# Patient Record
Sex: FEMALE | Race: WHITE | NOT HISPANIC OR LATINO
[De-identification: names, ages, dates, MRNs, and addresses within clinical notes are randomized per-mention and may not be internally consistent; named-entity substitution may affect disease eponyms.]

---

## 2017-04-26 ENCOUNTER — APPOINTMENT (OUTPATIENT)
Dept: OBGYN | Facility: CLINIC | Age: 47
End: 2017-04-26

## 2017-05-03 ENCOUNTER — OUTPATIENT (OUTPATIENT)
Dept: OUTPATIENT SERVICES | Facility: HOSPITAL | Age: 47
LOS: 1 days | End: 2017-05-03
Payer: COMMERCIAL

## 2017-05-03 ENCOUNTER — APPOINTMENT (OUTPATIENT)
Dept: ULTRASOUND IMAGING | Facility: CLINIC | Age: 47
End: 2017-05-03
Payer: COMMERCIAL

## 2017-05-03 DIAGNOSIS — Z00.8 ENCOUNTER FOR OTHER GENERAL EXAMINATION: ICD-10-CM

## 2017-05-03 PROCEDURE — 76856 US EXAM PELVIC COMPLETE: CPT | Mod: 26,59

## 2017-05-03 PROCEDURE — 76830 TRANSVAGINAL US NON-OB: CPT

## 2017-05-03 PROCEDURE — 76856 US EXAM PELVIC COMPLETE: CPT

## 2017-05-03 PROCEDURE — 76830 TRANSVAGINAL US NON-OB: CPT | Mod: 26

## 2017-06-01 ENCOUNTER — OUTPATIENT (OUTPATIENT)
Dept: OUTPATIENT SERVICES | Facility: HOSPITAL | Age: 47
LOS: 1 days | End: 2017-06-01
Payer: COMMERCIAL

## 2017-06-01 VITALS
TEMPERATURE: 99 F | SYSTOLIC BLOOD PRESSURE: 132 MMHG | WEIGHT: 169.76 LBS | OXYGEN SATURATION: 98 % | RESPIRATION RATE: 18 BRPM | DIASTOLIC BLOOD PRESSURE: 88 MMHG | HEART RATE: 75 BPM | HEIGHT: 60 IN

## 2017-06-01 DIAGNOSIS — Z01.818 ENCOUNTER FOR OTHER PREPROCEDURAL EXAMINATION: ICD-10-CM

## 2017-06-01 DIAGNOSIS — D25.9 LEIOMYOMA OF UTERUS, UNSPECIFIED: ICD-10-CM

## 2017-06-01 DIAGNOSIS — Z98.890 OTHER SPECIFIED POSTPROCEDURAL STATES: Chronic | ICD-10-CM

## 2017-06-01 DIAGNOSIS — I10 ESSENTIAL (PRIMARY) HYPERTENSION: ICD-10-CM

## 2017-06-01 LAB
ANION GAP SERPL CALC-SCNC: 19 MMOL/L — HIGH (ref 5–17)
BLD GP AB SCN SERPL QL: NEGATIVE — SIGNIFICANT CHANGE UP
BUN SERPL-MCNC: 13 MG/DL — SIGNIFICANT CHANGE UP (ref 7–23)
CALCIUM SERPL-MCNC: 9.5 MG/DL — SIGNIFICANT CHANGE UP (ref 8.4–10.5)
CHLORIDE SERPL-SCNC: 101 MMOL/L — SIGNIFICANT CHANGE UP (ref 96–108)
CO2 SERPL-SCNC: 20 MMOL/L — LOW (ref 22–31)
CREAT SERPL-MCNC: 0.71 MG/DL — SIGNIFICANT CHANGE UP (ref 0.5–1.3)
GLUCOSE SERPL-MCNC: 98 MG/DL — SIGNIFICANT CHANGE UP (ref 70–99)
HCT VFR BLD CALC: 34.5 % — SIGNIFICANT CHANGE UP (ref 34.5–45)
HGB BLD-MCNC: 10.2 G/DL — LOW (ref 11.5–15.5)
MCHC RBC-ENTMCNC: 24.4 PG — LOW (ref 27–34)
MCHC RBC-ENTMCNC: 29.6 GM/DL — LOW (ref 32–36)
MCV RBC AUTO: 82.5 FL — SIGNIFICANT CHANGE UP (ref 80–100)
PLATELET # BLD AUTO: 291 K/UL — SIGNIFICANT CHANGE UP (ref 150–400)
POTASSIUM SERPL-MCNC: 4 MMOL/L — SIGNIFICANT CHANGE UP (ref 3.5–5.3)
POTASSIUM SERPL-SCNC: 4 MMOL/L — SIGNIFICANT CHANGE UP (ref 3.5–5.3)
RBC # BLD: 4.18 M/UL — SIGNIFICANT CHANGE UP (ref 3.8–5.2)
RBC # FLD: 14.4 % — SIGNIFICANT CHANGE UP (ref 10.3–14.5)
RH IG SCN BLD-IMP: POSITIVE — SIGNIFICANT CHANGE UP
SODIUM SERPL-SCNC: 140 MMOL/L — SIGNIFICANT CHANGE UP (ref 135–145)
WBC # BLD: 6.79 K/UL — SIGNIFICANT CHANGE UP (ref 3.8–10.5)
WBC # FLD AUTO: 6.79 K/UL — SIGNIFICANT CHANGE UP (ref 3.8–10.5)

## 2017-06-01 PROCEDURE — 85027 COMPLETE CBC AUTOMATED: CPT

## 2017-06-01 PROCEDURE — G0463: CPT

## 2017-06-01 PROCEDURE — 80048 BASIC METABOLIC PNL TOTAL CA: CPT

## 2017-06-01 PROCEDURE — 86900 BLOOD TYPING SEROLOGIC ABO: CPT

## 2017-06-01 PROCEDURE — 86901 BLOOD TYPING SEROLOGIC RH(D): CPT

## 2017-06-01 PROCEDURE — 86850 RBC ANTIBODY SCREEN: CPT

## 2017-06-01 RX ORDER — FAMOTIDINE 10 MG/ML
20 INJECTION INTRAVENOUS ONCE
Qty: 0 | Refills: 0 | Status: DISCONTINUED | OUTPATIENT
Start: 2017-06-15 | End: 2017-06-17

## 2017-06-01 RX ORDER — CEFOTETAN DISODIUM 1 G
2 VIAL (EA) INJECTION ONCE
Qty: 0 | Refills: 0 | Status: DISCONTINUED | OUTPATIENT
Start: 2017-06-15 | End: 2017-06-16

## 2017-06-01 RX ORDER — CELECOXIB 200 MG/1
200 CAPSULE ORAL ONCE
Qty: 0 | Refills: 0 | Status: DISCONTINUED | OUTPATIENT
Start: 2017-06-15 | End: 2017-06-17

## 2017-06-01 RX ORDER — OXYCODONE HYDROCHLORIDE 5 MG/1
10 TABLET ORAL ONCE
Qty: 0 | Refills: 0 | Status: COMPLETED | OUTPATIENT
Start: 2017-06-15 | End: 2017-06-15

## 2017-06-01 RX ORDER — ACETAMINOPHEN 500 MG
975 TABLET ORAL ONCE
Qty: 0 | Refills: 0 | Status: DISCONTINUED | OUTPATIENT
Start: 2017-06-15 | End: 2017-06-16

## 2017-06-01 RX ORDER — SODIUM CHLORIDE 9 MG/ML
3 INJECTION INTRAMUSCULAR; INTRAVENOUS; SUBCUTANEOUS EVERY 8 HOURS
Qty: 0 | Refills: 0 | Status: DISCONTINUED | OUTPATIENT
Start: 2017-06-15 | End: 2017-06-15

## 2017-06-01 NOTE — H&P PST ADULT - LYMPHATIC
posterior cervical R/anterior cervical R/supraclavicular R/posterior cervical L/supraclavicular L/anterior cervical L

## 2017-06-01 NOTE — H&P PST ADULT - VISION (WITH CORRECTIVE LENSES IF THE PATIENT USUALLY WEARS THEM):
Partially impaired: cannot see medication labels or newsprint, but can see obstacles in path, and the surrounding layout; can count fingers at arm's length/wear contact lenses

## 2017-06-01 NOTE — H&P PST ADULT - REASON FOR ADMISSION
"I am going to have my abdominal hysterectomy ) "I am going to have my abdominal hysterectomy " "I am going to have a abdominal hysterectomy "

## 2017-06-01 NOTE — H&P PST ADULT - MUSCULOSKELETAL
negative detailed exam no joint swelling/no calf tenderness/normal/ROM intact/no joint erythema/no joint warmth

## 2017-06-01 NOTE — H&P PST ADULT - HISTORY OF PRESENT ILLNESS
46 year old female with PMH of HTN, Uterine fibroid (h/o uterine artery embolization in 2011).Pt has been experiencing abdominal pain , menorrhagia f/u with GYN . Now coming in PST for scheduled  Total Abdominal Hysterectomy Bilateral Salpingectomy Exploratory Laparotomy Possible  Supracervical Abdominal Hysterectomy on 6/15/2017.

## 2017-06-01 NOTE — H&P PST ADULT - NEGATIVE GASTROINTESTINAL SYMPTOMS
no melena/no jaundice/no hematochezia/no steatorrhea/no hiccoughs/no nausea/no constipation/no vomiting/no diarrhea

## 2017-06-01 NOTE — H&P PST ADULT - PMH
Fibroids  uterine  HTN (Hypertension)    Leiomyoma of uterus HTN (Hypertension)    Leiomyoma of uterus    Migraine

## 2017-06-01 NOTE — H&P PST ADULT - NSANTHOSAYNRD_GEN_A_CORE
No. ALICIA screening performed.  STOP BANG Legend: 0-2 = LOW Risk; 3-4 = INTERMEDIATE Risk; 5-8 = HIGH Risk

## 2017-06-01 NOTE — H&P PST ADULT - ATTENDING COMMENTS
Pt with long hx of symptomaic uterine fibroids and is s/p failed UFE admitted for expl laparotomy JOHNY BS the risks of surgery including the possible need for supracervical hysterectomy were discussed.

## 2017-06-01 NOTE — H&P PST ADULT - PROBLEM SELECTOR PLAN 1
Total Abdominal Hysterectomy Bilateral Salpingectomy Exploratory Laparotomy Possible  Supracervical Abdominal Hysterectomy on 6/15/2017  Pre- Op instructions given  CBC,BMP Type and Screen sent  Pre- emptive ordered Total Abdominal Hysterectomy Bilateral Salpingectomy Exploratory Laparotomy Possible  Supracervical Abdominal Hysterectomy on 6/15/2017  Pre- Op instructions given  CBC,BMP Type and Screen sent  Pre- emptive ordered  urine pregnancy the DOS

## 2017-06-15 ENCOUNTER — INPATIENT (INPATIENT)
Facility: HOSPITAL | Age: 47
LOS: 1 days | Discharge: HOME | DRG: 743 | End: 2017-06-17
Attending: OBSTETRICS & GYNECOLOGY | Admitting: OBSTETRICS & GYNECOLOGY
Payer: COMMERCIAL

## 2017-06-15 ENCOUNTER — RESULT REVIEW (OUTPATIENT)
Age: 47
End: 2017-06-15

## 2017-06-15 ENCOUNTER — APPOINTMENT (OUTPATIENT)
Dept: OBGYN | Facility: HOSPITAL | Age: 47
End: 2017-06-15

## 2017-06-15 ENCOUNTER — TRANSCRIPTION ENCOUNTER (OUTPATIENT)
Age: 47
End: 2017-06-15

## 2017-06-15 VITALS
HEART RATE: 82 BPM | TEMPERATURE: 98 F | HEIGHT: 60 IN | WEIGHT: 169.76 LBS | DIASTOLIC BLOOD PRESSURE: 90 MMHG | SYSTOLIC BLOOD PRESSURE: 161 MMHG | RESPIRATION RATE: 20 BRPM | OXYGEN SATURATION: 100 %

## 2017-06-15 DIAGNOSIS — Z98.890 OTHER SPECIFIED POSTPROCEDURAL STATES: Chronic | ICD-10-CM

## 2017-06-15 DIAGNOSIS — D25.9 LEIOMYOMA OF UTERUS, UNSPECIFIED: ICD-10-CM

## 2017-06-15 LAB — HCG UR QL: NEGATIVE — SIGNIFICANT CHANGE UP

## 2017-06-15 PROCEDURE — 88304 TISSUE EXAM BY PATHOLOGIST: CPT | Mod: 26

## 2017-06-15 PROCEDURE — 88307 TISSUE EXAM BY PATHOLOGIST: CPT | Mod: 26

## 2017-06-15 PROCEDURE — 88305 TISSUE EXAM BY PATHOLOGIST: CPT | Mod: 26

## 2017-06-15 PROCEDURE — 58150 TOTAL HYSTERECTOMY: CPT

## 2017-06-15 RX ORDER — HEPARIN SODIUM 5000 [USP'U]/ML
5000 INJECTION INTRAVENOUS; SUBCUTANEOUS ONCE
Qty: 0 | Refills: 0 | Status: COMPLETED | OUTPATIENT
Start: 2017-06-15 | End: 2017-06-15

## 2017-06-15 RX ORDER — ONDANSETRON 8 MG/1
4 TABLET, FILM COATED ORAL EVERY 6 HOURS
Qty: 0 | Refills: 0 | Status: DISCONTINUED | OUTPATIENT
Start: 2017-06-15 | End: 2017-06-17

## 2017-06-15 RX ORDER — HYDROMORPHONE HYDROCHLORIDE 2 MG/ML
30 INJECTION INTRAMUSCULAR; INTRAVENOUS; SUBCUTANEOUS
Qty: 0 | Refills: 0 | Status: DISCONTINUED | OUTPATIENT
Start: 2017-06-15 | End: 2017-06-16

## 2017-06-15 RX ORDER — NALOXONE HYDROCHLORIDE 4 MG/.1ML
0.1 SPRAY NASAL
Qty: 0 | Refills: 0 | Status: DISCONTINUED | OUTPATIENT
Start: 2017-06-15 | End: 2017-06-17

## 2017-06-15 RX ORDER — HYDROMORPHONE HYDROCHLORIDE 2 MG/ML
0.5 INJECTION INTRAMUSCULAR; INTRAVENOUS; SUBCUTANEOUS
Qty: 0 | Refills: 0 | Status: DISCONTINUED | OUTPATIENT
Start: 2017-06-15 | End: 2017-06-15

## 2017-06-15 RX ORDER — HEPARIN SODIUM 5000 [USP'U]/ML
5000 INJECTION INTRAVENOUS; SUBCUTANEOUS EVERY 12 HOURS
Qty: 0 | Refills: 0 | Status: DISCONTINUED | OUTPATIENT
Start: 2017-06-15 | End: 2017-06-17

## 2017-06-15 RX ORDER — HYDROMORPHONE HYDROCHLORIDE 2 MG/ML
0.5 INJECTION INTRAMUSCULAR; INTRAVENOUS; SUBCUTANEOUS
Qty: 0 | Refills: 0 | Status: DISCONTINUED | OUTPATIENT
Start: 2017-06-15 | End: 2017-06-16

## 2017-06-15 RX ORDER — ONDANSETRON 8 MG/1
4 TABLET, FILM COATED ORAL ONCE
Qty: 0 | Refills: 0 | Status: DISCONTINUED | OUTPATIENT
Start: 2017-06-15 | End: 2017-06-15

## 2017-06-15 RX ORDER — METOPROLOL TARTRATE 50 MG
5 TABLET ORAL EVERY 6 HOURS
Qty: 0 | Refills: 0 | Status: DISCONTINUED | OUTPATIENT
Start: 2017-06-15 | End: 2017-06-16

## 2017-06-15 RX ORDER — SODIUM CHLORIDE 9 MG/ML
1000 INJECTION, SOLUTION INTRAVENOUS
Qty: 0 | Refills: 0 | Status: DISCONTINUED | OUTPATIENT
Start: 2017-06-15 | End: 2017-06-16

## 2017-06-15 RX ADMIN — SODIUM CHLORIDE 125 MILLILITER(S): 9 INJECTION, SOLUTION INTRAVENOUS at 17:15

## 2017-06-15 RX ADMIN — HEPARIN SODIUM 5000 UNIT(S): 5000 INJECTION INTRAVENOUS; SUBCUTANEOUS at 11:24

## 2017-06-15 RX ADMIN — HYDROMORPHONE HYDROCHLORIDE 0.5 MILLIGRAM(S): 2 INJECTION INTRAMUSCULAR; INTRAVENOUS; SUBCUTANEOUS at 16:06

## 2017-06-15 RX ADMIN — HYDROMORPHONE HYDROCHLORIDE 30 MILLILITER(S): 2 INJECTION INTRAMUSCULAR; INTRAVENOUS; SUBCUTANEOUS at 16:07

## 2017-06-15 RX ADMIN — HEPARIN SODIUM 5000 UNIT(S): 5000 INJECTION INTRAVENOUS; SUBCUTANEOUS at 20:11

## 2017-06-15 NOTE — BRIEF OPERATIVE NOTE - OPERATION/FINDINGS
24wk sized uterus with numerous large fibroids, most subserosal. Left ovary and fallopian tube grossly normal. Right fallopian tube and ovary adhered to posterior uterus.

## 2017-06-15 NOTE — PROGRESS NOTE ADULT - SUBJECTIVE AND OBJECTIVE BOX
POST-OP CHECK  PA Note    Allergies    No Known Drug Allergies  seasonal (Sneezing)    Intolerances        S: Pt sleeping, easily arousable  Pain controlled. Pt denies N/V, SOB, CP, palpitations.   Torres    O:   T(C): 36.2, Max: 36.2 (06-15 @ 15:45)  HR: 78 (67 - 80)  BP: 132/78 (130/75 - 164/90)  RR: 18 (17 - 19)  SpO2: 92% (92% - 99%)  Wt(kg): --  I&O's Summary    I & Os for current day (as of 15 Kurtis 2017 18:04)  =============================================  IN: 0 ml / OUT: 75 ml / NET: -75 ml                       OR           PACU  (I)                              IVF LR@125  (O)  EBL    CV: RRR  Lungs: CTA B/L  Abd: +BS, soft, appropriately tender  Inc: Clean/dry/iintact  Ext: SCDs in place, Neg Homans B/L    A/P: 46y Female S/P exlap, JOHNY, BS,   with PMHx of   PAST MEDICAL & SURGICAL HISTORY:  Migraine  Leiomyoma of uterus  Fibroids: uterine  HTN (Hypertension)  Status post embolization of uterine artery: 2011  C Section: x2 in 2000 and 2003      -Neuro - AAO x 3, Pain well controlled with PCA  -Heme - hemodynamically stable  -CV - asymptomatic, CBC  -Pulm - encourage IS, O2sat   -GI - Diet-  Regular  Advance as Tolerated, mylicon  - - Torres to gravity    -DVT prophylaxis - SCDs in place, encourage ambulation, HSQ  cbc in am    -Meds:   famotidine    Tablet 20milliGRAM(s) Oral once  acetaminophen   Tablet. 975milliGRAM(s) Oral once  celecoxib 200milliGRAM(s) Oral once  cefoTEtan  IVPB 2Gram(s) IV Intermittent once  HYDROmorphone  Injectable 0.5milliGRAM(s) IV Push every 10 minutes PRN  ondansetron Injectable 4milliGRAM(s) IV Push once PRN  HYDROmorphone PCA (1 mG/mL) 30milliLiter(s) PCA Continuous PCA Continuous  HYDROmorphone PCA (1 mG/mL) Rescue Clinician Bolus 0.5milliGRAM(s) IV Push every 15 minutes PRN  naloxone Injectable 0.1milliGRAM(s) IV Push every 3 minutes PRN  ondansetron Injectable 4milliGRAM(s) IV Push every 6 hours PRN  heparin  Injectable 5000Unit(s) SubCutaneous every 12 hours  lactated ringers. 1000milliLiter(s) IV Continuous <Continuous>    -Dispo: stable for transfer from PACU, once meeting all PACU milestones

## 2017-06-15 NOTE — BRIEF OPERATIVE NOTE - PROCEDURE
Bilateral salpingectomy  06/15/2017    Active  BEVERLEY  Total abdominal hysterectomy  06/15/2017    Active  BEVERLEY  Exploratory laparotomy  06/15/2017    Active  BEVERLEY

## 2017-06-16 DIAGNOSIS — D25.9 LEIOMYOMA OF UTERUS, UNSPECIFIED: ICD-10-CM

## 2017-06-16 LAB
BASOPHILS # BLD AUTO: 0 K/UL — SIGNIFICANT CHANGE UP (ref 0–0.2)
BASOPHILS NFR BLD AUTO: 0.1 % — SIGNIFICANT CHANGE UP (ref 0–2)
EOSINOPHIL # BLD AUTO: 0 K/UL — SIGNIFICANT CHANGE UP (ref 0–0.5)
EOSINOPHIL NFR BLD AUTO: 0.2 % — SIGNIFICANT CHANGE UP (ref 0–6)
HCT VFR BLD CALC: 26.8 % — LOW (ref 34.5–45)
HCT VFR BLD CALC: 27.5 % — LOW (ref 34.5–45)
HGB BLD-MCNC: 8.4 G/DL — LOW (ref 11.5–15.5)
HGB BLD-MCNC: 8.7 G/DL — LOW (ref 11.5–15.5)
LYMPHOCYTES # BLD AUTO: 1.4 K/UL — SIGNIFICANT CHANGE UP (ref 1–3.3)
LYMPHOCYTES # BLD AUTO: 14.2 % — SIGNIFICANT CHANGE UP (ref 13–44)
MCHC RBC-ENTMCNC: 24.3 PG — LOW (ref 27–34)
MCHC RBC-ENTMCNC: 25.9 PG — LOW (ref 27–34)
MCHC RBC-ENTMCNC: 30.5 GM/DL — LOW (ref 32–36)
MCHC RBC-ENTMCNC: 32.5 GM/DL — SIGNIFICANT CHANGE UP (ref 32–36)
MCV RBC AUTO: 79.6 FL — LOW (ref 80–100)
MCV RBC AUTO: 79.8 FL — LOW (ref 80–100)
MONOCYTES # BLD AUTO: 0.9 K/UL — SIGNIFICANT CHANGE UP (ref 0–0.9)
MONOCYTES NFR BLD AUTO: 9 % — SIGNIFICANT CHANGE UP (ref 2–14)
NEUTROPHILS # BLD AUTO: 7.6 K/UL — HIGH (ref 1.8–7.4)
NEUTROPHILS NFR BLD AUTO: 76.5 % — SIGNIFICANT CHANGE UP (ref 43–77)
PLATELET # BLD AUTO: 223 K/UL — SIGNIFICANT CHANGE UP (ref 150–400)
PLATELET # BLD AUTO: 225 K/UL — SIGNIFICANT CHANGE UP (ref 150–400)
RBC # BLD: 3.36 M/UL — LOW (ref 3.8–5.2)
RBC # BLD: 3.45 M/UL — LOW (ref 3.8–5.2)
RBC # FLD: 13.8 % — SIGNIFICANT CHANGE UP (ref 10.3–14.5)
RBC # FLD: 14.1 % — SIGNIFICANT CHANGE UP (ref 10.3–14.5)
WBC # BLD: 8.5 K/UL — SIGNIFICANT CHANGE UP (ref 3.8–10.5)
WBC # BLD: 9.9 K/UL — SIGNIFICANT CHANGE UP (ref 3.8–10.5)
WBC # FLD AUTO: 8.5 K/UL — SIGNIFICANT CHANGE UP (ref 3.8–10.5)
WBC # FLD AUTO: 9.9 K/UL — SIGNIFICANT CHANGE UP (ref 3.8–10.5)

## 2017-06-16 RX ORDER — KETOROLAC TROMETHAMINE 30 MG/ML
30 SYRINGE (ML) INJECTION ONCE
Qty: 0 | Refills: 0 | Status: DISCONTINUED | OUTPATIENT
Start: 2017-06-16 | End: 2017-06-16

## 2017-06-16 RX ORDER — METOPROLOL TARTRATE 50 MG
50 TABLET ORAL DAILY
Qty: 0 | Refills: 0 | Status: DISCONTINUED | OUTPATIENT
Start: 2017-06-16 | End: 2017-06-17

## 2017-06-16 RX ORDER — METOPROLOL TARTRATE 50 MG
50 TABLET ORAL DAILY
Qty: 0 | Refills: 0 | Status: DISCONTINUED | OUTPATIENT
Start: 2017-06-16 | End: 2017-06-16

## 2017-06-16 RX ORDER — SODIUM CHLORIDE 9 MG/ML
3 INJECTION INTRAMUSCULAR; INTRAVENOUS; SUBCUTANEOUS EVERY 8 HOURS
Qty: 0 | Refills: 0 | Status: DISCONTINUED | OUTPATIENT
Start: 2017-06-16 | End: 2017-06-17

## 2017-06-16 RX ORDER — KETOROLAC TROMETHAMINE 30 MG/ML
30 SYRINGE (ML) INJECTION EVERY 6 HOURS
Qty: 0 | Refills: 0 | Status: DISCONTINUED | OUTPATIENT
Start: 2017-06-16 | End: 2017-06-16

## 2017-06-16 RX ORDER — IBUPROFEN 200 MG
600 TABLET ORAL EVERY 6 HOURS
Qty: 0 | Refills: 0 | Status: DISCONTINUED | OUTPATIENT
Start: 2017-06-16 | End: 2017-06-17

## 2017-06-16 RX ORDER — ACETAMINOPHEN 500 MG
975 TABLET ORAL EVERY 6 HOURS
Qty: 0 | Refills: 0 | Status: DISCONTINUED | OUTPATIENT
Start: 2017-06-16 | End: 2017-06-17

## 2017-06-16 RX ORDER — ACETAMINOPHEN 500 MG
1000 TABLET ORAL ONCE
Qty: 0 | Refills: 0 | Status: COMPLETED | OUTPATIENT
Start: 2017-06-16 | End: 2017-06-16

## 2017-06-16 RX ORDER — OXYCODONE HYDROCHLORIDE 5 MG/1
5 TABLET ORAL
Qty: 0 | Refills: 0 | Status: DISCONTINUED | OUTPATIENT
Start: 2017-06-16 | End: 2017-06-17

## 2017-06-16 RX ADMIN — HEPARIN SODIUM 5000 UNIT(S): 5000 INJECTION INTRAVENOUS; SUBCUTANEOUS at 20:45

## 2017-06-16 RX ADMIN — Medication 975 MILLIGRAM(S): at 13:39

## 2017-06-16 RX ADMIN — Medication 400 MILLIGRAM(S): at 08:09

## 2017-06-16 RX ADMIN — Medication 600 MILLIGRAM(S): at 13:39

## 2017-06-16 RX ADMIN — HEPARIN SODIUM 5000 UNIT(S): 5000 INJECTION INTRAVENOUS; SUBCUTANEOUS at 07:57

## 2017-06-16 RX ADMIN — Medication 30 MILLIGRAM(S): at 08:45

## 2017-06-16 RX ADMIN — Medication 30 MILLIGRAM(S): at 07:51

## 2017-06-16 RX ADMIN — Medication 30 MILLIGRAM(S): at 01:09

## 2017-06-16 RX ADMIN — Medication 975 MILLIGRAM(S): at 19:55

## 2017-06-16 RX ADMIN — Medication 1000 MILLIGRAM(S): at 08:45

## 2017-06-16 RX ADMIN — SODIUM CHLORIDE 3 MILLILITER(S): 9 INJECTION INTRAMUSCULAR; INTRAVENOUS; SUBCUTANEOUS at 13:45

## 2017-06-16 RX ADMIN — Medication 600 MILLIGRAM(S): at 14:13

## 2017-06-16 RX ADMIN — Medication 975 MILLIGRAM(S): at 20:25

## 2017-06-16 RX ADMIN — Medication 600 MILLIGRAM(S): at 20:25

## 2017-06-16 RX ADMIN — Medication 600 MILLIGRAM(S): at 19:55

## 2017-06-16 RX ADMIN — Medication 50 MILLIGRAM(S): at 11:50

## 2017-06-16 RX ADMIN — Medication 975 MILLIGRAM(S): at 14:13

## 2017-06-16 NOTE — PROGRESS NOTE ADULT - SUBJECTIVE AND OBJECTIVE BOX
Day 2 of Anesthesia Pain Management Service    SUBJECTIVE: I'm okay  Pain Scale Score	At rest: ___ 	With Activity: ___ 	[  x ] Refer to charted pain scores    THERAPY:  [ ] Epidural Bupivacaine 0.0625% and Hydromorphone 10 micrograms/mL  [ ] Epidural Ropivacaine 0.2% plain   [ x ] Epidural Bupivacaine 0.01 % and Fentanyl 3 micrograms/mL  (OB)    Demand dose 3 mL lockout  15 minutes Continuous Rate 10mL    MEDICATIONS  (STANDING):  famotidine    Tablet 20milliGRAM(s) Oral once  acetaminophen   Tablet. 975milliGRAM(s) Oral once  celecoxib 200milliGRAM(s) Oral once  cefoTEtan  IVPB 2Gram(s) IV Intermittent once  heparin  Injectable 5000Unit(s) SubCutaneous every 12 hours  lactated ringers. 1000milliLiter(s) IV Continuous <Continuous>  ketorolac   Injectable 30milliGRAM(s) IV Push every 6 hours  hydrochlorothiazide   Tablet 25milliGRAM(s) Oral daily  metoprolol succinate ER 50milliGRAM(s) Oral daily    MEDICATIONS  (PRN):  naloxone Injectable 0.1milliGRAM(s) IV Push every 3 minutes PRN For ANY of the following changes in patient status:  A. RR LESS THAN 10 breaths per minute, B. Oxygen saturation LESS THAN 90%, C. Sedation score of 6  ondansetron Injectable 4milliGRAM(s) IV Push every 6 hours PRN Nausea      OBJECTIVE:    Assessment of Epidural Catheter Site: 	    [  ] Dressing intact	[x ] Site non-tender	[x ] Site without erythema, discharge, edema  [  ] Epidural tubing and connection checked	[ x] Gross neurological exam within normal limits  [ ] Catheter removed – tip intact		                          8.4    9.9   )-----------( 225      ( 16 Jun 2017 06:48 )             27.5     Vital Signs Last 24 Hrs  T(C): 36.7, Max: 37.6 (06-16 @ 05:31)  T(F): 98.1, Max: 99.7 (06-16 @ 05:31)  HR: 82 (67 - 84)  BP: 120/79 (111/68 - 164/90)  BP(mean): 100 (95 - 121)  RR: 16 (16 - 19)  SpO2: 97% (92% - 99%)      Sedation Score:	[x ] Alert	[ ] Drowsy	[ ] Arousable  [ ] Asleep  [ ] Unresponsive    Side Effects:	[ x] None	[ ] Nausea	[ ] Vomiting  [ ] Pruritus  		[ ] Weakness  [ ] Numbness  [ ] Other:    ASSESSMENT/ PLAN:    Therapy to  be:	[  ] Continue   [ X] Discontinued   [X ] Change to prn Analgesics    Documentation and Verification of current medications:  [ X ] Done	[ ] Not done, not eligible, reason:    Comments:

## 2017-06-16 NOTE — PROGRESS NOTE ADULT - SUBJECTIVE AND OBJECTIVE BOX
Pain Management Attending Addendum    SUBJECTIVE:    Therapy:	  [ x] IV PCA	   [ ] Epidural           [ ] s/p Spinal Opoid              [ ] Postpartum infusion	  [ ] Patient controlled regional anesthesia (PCRA)    [ ] prn Analgesics    OBJECTIVE:   [x ] No new signs     [ ] Other:    Side Effects:  [x ] None			[ ] Other:    Assessment of Catheter Site:		[ ] Intact		[ ] Other:    ASSESSMENT/PLAN  [ ] Continue current therapy    [ x] Therapy changed to:    [ ] IV PCA       [ ] Epidural     [ x] prn Analgesics     [ ] post partum infusion    Comments:

## 2017-06-16 NOTE — PROGRESS NOTE ADULT - SUBJECTIVE AND OBJECTIVE BOX
Day 1 of Anesthesia Pain Management Service    SUBJECTIVE: It made me nauseous    Pain Scale Score	At rest: ___ 	With Activity: ___ 	[X] Refer to charted pain scores    THERAPY:    [ ] IV PCA Morphine		[ ] 5 mg/mL	[ ] 1 mg/mL  [X ] IV PCA Hydromorphone	[ ] 5 mg/mL	[X ] 1 mg/mL  [ ] IV PCA Fentanyl		[ ] 50 micrograms/mL    Demand dose   0.2mg lockout   6 minutes 0  Continuous Rate        MEDICATIONS  (STANDING):  famotidine    Tablet 20milliGRAM(s) Oral once  acetaminophen   Tablet. 975milliGRAM(s) Oral once  celecoxib 200milliGRAM(s) Oral once  cefoTEtan  IVPB 2Gram(s) IV Intermittent once  heparin  Injectable 5000Unit(s) SubCutaneous every 12 hours  lactated ringers. 1000milliLiter(s) IV Continuous <Continuous>  ketorolac   Injectable 30milliGRAM(s) IV Push every 6 hours  hydrochlorothiazide   Tablet 25milliGRAM(s) Oral daily  metoprolol succinate ER 50milliGRAM(s) Oral daily    MEDICATIONS  (PRN):  naloxone Injectable 0.1milliGRAM(s) IV Push every 3 minutes PRN For ANY of the following changes in patient status:  A. RR LESS THAN 10 breaths per minute, B. Oxygen saturation LESS THAN 90%, C. Sedation score of 6  ondansetron Injectable 4milliGRAM(s) IV Push every 6 hours PRN Nausea      OBJECTIVE:    Sedation Score:	[X ] Alert	[ ] Drowsy 	[ ] Arousable	[ ] Asleep	[ ] Unresponsive    Side Effects:	[ ] None	[X ] Nausea	[ ] Vomiting	[ ] Pruritus  		[ ] Other:    Vital Signs Last 24 Hrs  T(C): 36.7, Max: 37.6 (06-16 @ 05:31)  T(F): 98.1, Max: 99.7 (06-16 @ 05:31)  HR: 82 (67 - 84)  BP: 120/79 (111/68 - 164/90)  BP(mean): 100 (95 - 121)  RR: 16 (16 - 19)  SpO2: 97% (92% - 99%)    ASSESSMENT/ PLAN    Therapy to  be:	[ ] Continue   [ X] Discontinued   [ ] Change to prn Analgesics    Documentation and Verification of current medications:   [X] Done	[ ] Not done, not elligible    Comments: PCA discontinued by service

## 2017-06-16 NOTE — PROGRESS NOTE ADULT - SUBJECTIVE AND OBJECTIVE BOX
R4 GYN Progress Note    Pt seen and examined at bedside. Pain controlled with IV toradol. Denies CP, sob, n/v, fevers, chills. +OOB, no flatus . Tolerating clear fluids    MEDICATIONS  (STANDING):  famotidine    Tablet 20milliGRAM(s) Oral once  acetaminophen   Tablet. 975milliGRAM(s) Oral once  celecoxib 200milliGRAM(s) Oral once  cefoTEtan  IVPB 2Gram(s) IV Intermittent once  HYDROmorphone PCA (1 mG/mL) 30milliLiter(s) PCA Continuous PCA Continuous  heparin  Injectable 5000Unit(s) SubCutaneous every 12 hours  lactated ringers. 1000milliLiter(s) IV Continuous <Continuous>    MEDICATIONS  (PRN):  HYDROmorphone PCA (1 mG/mL) Rescue Clinician Bolus 0.5milliGRAM(s) IV Push every 15 minutes PRN for Pain Scale GREATER THAN 6  naloxone Injectable 0.1milliGRAM(s) IV Push every 3 minutes PRN For ANY of the following changes in patient status:  A. RR LESS THAN 10 breaths per minute, B. Oxygen saturation LESS THAN 90%, C. Sedation score of 6  ondansetron Injectable 4milliGRAM(s) IV Push every 6 hours PRN Nausea  metoprolol Injectable 5milliGRAM(s) IV Push every 6 hours PRN severe blood pressures      Allergies    No Known Drug Allergies  seasonal (Sneezing)    Intolerances      12 point ROS negative except as outlined above    Vital Signs Last 24 Hrs  T(C): 37.6, Max: 37.6 (06-16 @ 05:31)  T(F): 99.7, Max: 99.7 (06-16 @ 05:31)  HR: 84 (67 - 84)  BP: 111/68 (111/68 - 164/90)  BP(mean): 100 (95 - 121)  RR: 18 (16 - 20)  SpO2: 97% (92% - 100%)      PHYSICAL EXAM:    GA: NAD, A+0 x 3  CV: RRR  Pulm: CTA BL  Abd: soft, nontender, nondistended, no rebound or guarding, +BS  Incision: clean, dry and intact  Extremities: no swelling or calf tenderness, reflexes +2 bilaterally    LABS:  Am Cbc pending            RADIOLOGY & ADDITIONAL TESTS:

## 2017-06-16 NOTE — PROGRESS NOTE ADULT - PROBLEM SELECTOR PLAN 1
CV: hemodynamically stable, f/u AM CBC. Pt to be started on home medications metoprolol, HCTZ today  Pulm: saturating well on room air, increase IS   GI: reg diet  : shane in place, adequate UOP overnight, dc shane in AM  Heme: SCD, Increase OOB + SCD for DVT prophylaxis  Neuro: will dc PCA and give IV toradol and tylenol CV: hemodynamically stable, f/u AM CBC. Pt to be started on home medications metoprolol, HCTZ today  Pulm: saturating well on room air, increase IS   GI: reg diet  : shane in place, adequate UOP overnight, dc shane in AM  Heme: SQH, Increase OOB + SCD for DVT prophylaxis  Neuro: will dc PCA and give IV toradol and tylenol

## 2017-06-17 ENCOUNTER — TRANSCRIPTION ENCOUNTER (OUTPATIENT)
Age: 47
End: 2017-06-17

## 2017-06-17 VITALS
DIASTOLIC BLOOD PRESSURE: 79 MMHG | SYSTOLIC BLOOD PRESSURE: 114 MMHG | RESPIRATION RATE: 18 BRPM | TEMPERATURE: 99 F | HEART RATE: 73 BPM

## 2017-06-17 DIAGNOSIS — D25.9 LEIOMYOMA OF UTERUS, UNSPECIFIED: ICD-10-CM

## 2017-06-17 LAB
HCT VFR BLD CALC: 28.3 % — LOW (ref 34.5–45)
HGB BLD-MCNC: 9.2 G/DL — LOW (ref 11.5–15.5)
MCHC RBC-ENTMCNC: 26.1 PG — LOW (ref 27–34)
MCHC RBC-ENTMCNC: 32.5 GM/DL — SIGNIFICANT CHANGE UP (ref 32–36)
MCV RBC AUTO: 80.3 FL — SIGNIFICANT CHANGE UP (ref 80–100)
PLATELET # BLD AUTO: 249 K/UL — SIGNIFICANT CHANGE UP (ref 150–400)
RBC # BLD: 3.53 M/UL — LOW (ref 3.8–5.2)
RBC # FLD: 14 % — SIGNIFICANT CHANGE UP (ref 10.3–14.5)
WBC # BLD: 9.8 K/UL — SIGNIFICANT CHANGE UP (ref 3.8–10.5)
WBC # FLD AUTO: 9.8 K/UL — SIGNIFICANT CHANGE UP (ref 3.8–10.5)

## 2017-06-17 PROCEDURE — 85027 COMPLETE CBC AUTOMATED: CPT

## 2017-06-17 PROCEDURE — C1889: CPT

## 2017-06-17 PROCEDURE — 88304 TISSUE EXAM BY PATHOLOGIST: CPT

## 2017-06-17 PROCEDURE — 88307 TISSUE EXAM BY PATHOLOGIST: CPT

## 2017-06-17 PROCEDURE — 88305 TISSUE EXAM BY PATHOLOGIST: CPT

## 2017-06-17 PROCEDURE — 81025 URINE PREGNANCY TEST: CPT

## 2017-06-17 RX ORDER — OXYCODONE HYDROCHLORIDE 5 MG/1
1 TABLET ORAL
Qty: 20 | Refills: 0 | OUTPATIENT
Start: 2017-06-17 | End: 2017-06-22

## 2017-06-17 RX ORDER — IBUPROFEN 200 MG
1 TABLET ORAL
Qty: 20 | Refills: 0 | OUTPATIENT
Start: 2017-06-17 | End: 2017-06-22

## 2017-06-17 RX ORDER — SIMETHICONE 80 MG/1
80 TABLET, CHEWABLE ORAL EVERY 6 HOURS
Qty: 0 | Refills: 0 | Status: DISCONTINUED | OUTPATIENT
Start: 2017-06-17 | End: 2017-06-17

## 2017-06-17 RX ADMIN — Medication 975 MILLIGRAM(S): at 14:17

## 2017-06-17 RX ADMIN — Medication 50 MILLIGRAM(S): at 06:06

## 2017-06-17 RX ADMIN — Medication 975 MILLIGRAM(S): at 13:08

## 2017-06-17 RX ADMIN — SIMETHICONE 80 MILLIGRAM(S): 80 TABLET, CHEWABLE ORAL at 02:45

## 2017-06-17 RX ADMIN — Medication 975 MILLIGRAM(S): at 08:30

## 2017-06-17 RX ADMIN — Medication 600 MILLIGRAM(S): at 08:30

## 2017-06-17 RX ADMIN — Medication 600 MILLIGRAM(S): at 02:09

## 2017-06-17 RX ADMIN — SODIUM CHLORIDE 3 MILLILITER(S): 9 INJECTION INTRAMUSCULAR; INTRAVENOUS; SUBCUTANEOUS at 06:04

## 2017-06-17 RX ADMIN — Medication 975 MILLIGRAM(S): at 02:38

## 2017-06-17 RX ADMIN — SIMETHICONE 80 MILLIGRAM(S): 80 TABLET, CHEWABLE ORAL at 08:30

## 2017-06-17 RX ADMIN — SODIUM CHLORIDE 3 MILLILITER(S): 9 INJECTION INTRAMUSCULAR; INTRAVENOUS; SUBCUTANEOUS at 00:52

## 2017-06-17 RX ADMIN — Medication 600 MILLIGRAM(S): at 13:08

## 2017-06-17 RX ADMIN — Medication 600 MILLIGRAM(S): at 09:04

## 2017-06-17 RX ADMIN — Medication 600 MILLIGRAM(S): at 02:38

## 2017-06-17 RX ADMIN — Medication 600 MILLIGRAM(S): at 14:17

## 2017-06-17 RX ADMIN — HEPARIN SODIUM 5000 UNIT(S): 5000 INJECTION INTRAVENOUS; SUBCUTANEOUS at 08:31

## 2017-06-17 RX ADMIN — Medication 975 MILLIGRAM(S): at 09:04

## 2017-06-17 RX ADMIN — Medication 975 MILLIGRAM(S): at 02:08

## 2017-06-17 NOTE — PROGRESS NOTE ADULT - SUBJECTIVE AND OBJECTIVE BOX
POD#2   HD#3    Patient seen and examined at bedside, no acute overnight events. No acute complaints, pain well controlled.  Patient is ambulating, passing flatus, voiding spontaneously, and tolerating regular diet. Denies CP, SOB, N/V, fevers, and chills.    Vital Signs Last 24 Hours  T(C): 37.3, Max: 37.3 (06-17 @ 08:36)  HR: 73 (71 - 83)  BP: 114/79 (114/74 - 124/80)  RR: 18 (18 - 18)  SpO2: 98% (97% - 98%)      I&O's Summary    I & Os for current day (as of 17 Jun 2017 08:58)  =============================================  IN: 0 ml / OUT: 1800 ml / NET: -1800 ml      Physical Exam:  General: NAD  CV: NR, RR, S1, S2, no M/R/G  Lungs: CTA-B  Abdomen: Soft, non-tender, non-distended, +BS  Incision: Pfannenstiel,  CDI  Ext: No pain or swelling    Labs:                        8.7    8.5   )-----------( 223      ( 16 Jun 2017 13:53 )             26.8   baso x      eos x      imm gran x      lymph x      mono x      poly x                            8.4    9.9   )-----------( 225      ( 16 Jun 2017 06:48 )             27.5   baso 0.1    eos 0.2    imm gran x      lymph 14.2   mono 9.0    poly 76.5       MEDICATIONS  (STANDING):  famotidine    Tablet 20milliGRAM(s) Oral once  celecoxib 200milliGRAM(s) Oral once  heparin  Injectable 5000Unit(s) SubCutaneous every 12 hours  hydrochlorothiazide   Tablet 25milliGRAM(s) Oral daily  metoprolol succinate ER 50milliGRAM(s) Oral daily  sodium chloride 0.9% lock flush 3milliLiter(s) IV Push every 8 hours  acetaminophen   Tablet. 975milliGRAM(s) Oral every 6 hours  ibuprofen  Tablet 600milliGRAM(s) Oral every 6 hours  simethicone 80milliGRAM(s) Chew every 6 hours    MEDICATIONS  (PRN):  naloxone Injectable 0.1milliGRAM(s) IV Push every 3 minutes PRN For ANY of the following changes in patient status:  A. RR LESS THAN 10 breaths per minute, B. Oxygen saturation LESS THAN 90%, C. Sedation score of 6  ondansetron Injectable 4milliGRAM(s) IV Push every 6 hours PRN Nausea  oxyCODONE IR 5milliGRAM(s) Oral every 3 hours PRN Severe Pain (7 - 10)

## 2017-06-17 NOTE — DISCHARGE NOTE ADULT - CARE PROVIDER_API CALL
Jaspal Gomes), Obstetrics and Gynecology  [address] [place] [address]  Phone: [phone]  Fax: [phone]

## 2017-06-17 NOTE — PROGRESS NOTE ADULT - ATTENDING COMMENTS
GYN Fellow Note    Pt seen and examined. Agree with above note with additions. POD#1 s/p JOHNY, BS, doing well. VSS and abd exam benign. Adequate UOP overnight. DEDRA shane now. AM H/H 8.4/27.5. Will advance diet, ambulate pt and observe for sx of anemia. Will consider repeat CBC this afternoon. Restart home meds today. SQH ppx. Routine post op care.    Will d/w Dr. Eliseo Casiano
GYN Fellow Note    Pt seen and examined. Agree with above note. Pt doing well on POD#2 s/p JOHNY, BS. Meeting all milestones. AM H/H 9.2/28.3. Pt requesting DC home today. Reviewed post op instructions. Pt to f/u in office in two weeks.    Will d/w Dr. Eliseo Casiano MD

## 2017-06-17 NOTE — DISCHARGE NOTE ADULT - INSTRUCTIONS
Pt pain managed. Pt discharged to home follow up with MD as directed. Call if any increased pain, fever, chills, pain on urination. oozing, drainage at incision site.

## 2017-06-17 NOTE — PROGRESS NOTE ADULT - ASSESSMENT
45yo F POD 3 s/p Total Abdominal Hysterectomy, Bilateral Salpingectomy. Patient is stable. 45yo F POD 2 s/p Total Abdominal Hysterectomy, Bilateral Salpingectomy. Patient is stable.

## 2017-06-17 NOTE — DISCHARGE NOTE ADULT - PATIENT PORTAL LINK FT
“You can access the [employer] Patient Portal, offered by [facility], by registering with the following website: [email or web address]”

## 2017-06-17 NOTE — PROGRESS NOTE ADULT - PROBLEM SELECTOR PLAN 1
Neuro: c/w po pain meds  CV: Hemodynamically stable. Will follow up AM CBC. (5am CBC was drawn and got lost by the lab). STAT CBC being drawn now.  Pulm: Saturating well on room air, encourage oob/amb  GI: c/w regular diet  : voiding spontaneously  Heme: c/w HSQ and SCDs for DVT ppx  Dispo: Continue routine post-op care    KENYA Mckeon, PGY2  #[id number]

## 2017-06-17 NOTE — DISCHARGE NOTE ADULT - MEDICATION SUMMARY - MEDICATIONS TO TAKE
I will START or STAY ON the medications listed below when I get home from the hospital:    oxyCODONE 5 mg oral tablet  -- 1 tab(s) by mouth every 6 hours MDD:4  -- Caution federal law prohibits the transfer of this drug to any person other  than the person for whom it was prescribed.  It is very important that you take or use this exactly as directed.  Do not skip doses or discontinue unless directed by your doctor.  May cause drowsiness.  Alcohol may intensify this effect.  Use care when operating dangerous machinery.  This prescription cannot be refilled.  Using more of this medication than prescribed may cause serious breathing problems.    -- Indication: For Pain    Metoprolol Succinate ER 50 mg oral tablet, extended release  -- 1 tab(s) by mouth once a day  -- Indication: For Home    hydrochlorothiazide 25 mg oral tablet  -- 1 tab(s) by mouth once a day  -- Indication: For Home I will START or STAY ON the medications listed below when I get home from the hospital:    oxyCODONE 5 mg oral tablet  -- 1 tab(s) by mouth every 6 hours MDD:4  -- Caution federal law prohibits the transfer of this drug to any person other  than the person for whom it was prescribed.  It is very important that you take or use this exactly as directed.  Do not skip doses or discontinue unless directed by your doctor.  May cause drowsiness.  Alcohol may intensify this effect.  Use care when operating dangerous machinery.  This prescription cannot be refilled.  Using more of this medication than prescribed may cause serious breathing problems.    -- Indication: For Pain     mg oral tablet  -- 1 tab(s) by mouth every 6 hours MDD:4  -- Do not take this drug if you are pregnant.  It is very important that you take or use this exactly as directed.  Do not skip doses or discontinue unless directed by your doctor.  May cause drowsiness or dizziness.  Obtain medical advice before taking any non-prescription drugs as some may affect the action of this medication.  Take with food or milk.    -- Indication: For Pain    Metoprolol Succinate ER 50 mg oral tablet, extended release  -- 1 tab(s) by mouth once a day  -- Indication: For Home    hydrochlorothiazide 25 mg oral tablet  -- 1 tab(s) by mouth once a day  -- Indication: For Home

## 2017-06-17 NOTE — DISCHARGE NOTE ADULT - HOSPITAL COURSE
45 y/o s/p   Total Abdominal Hysterectomy, Bilateral Salpingectomy    EBL:100    HCT:34.5->27.5->26.8    .See operative note for details of procedure.  POD#0, patient was advanced to a regular diet.  POD#1, shane was discontinued and patient voided spontaneously.  Patient was discharged on POD#2 in stable condition with adequate pain control, ambulating, tolerating PO and voiding spontaneously.  Patient to follow up with Dr. Gomes  in 2 weeks. 45 y/o s/p   Total Abdominal Hysterectomy, Bilateral Salpingectomy    EBL:100    HCT:34.5->27.5->26.8->28.3    .See operative note for details of procedure.  POD#0, patient was advanced to a regular diet.  POD#1, shane was discontinued and patient voided spontaneously.  Patient was discharged on POD#2 in stable condition with adequate pain control, ambulating, tolerating PO and voiding spontaneously.  Patient to follow up with Dr. Gomes  in 2 weeks.

## 2017-06-20 LAB — SURGICAL PATHOLOGY STUDY: SIGNIFICANT CHANGE UP

## 2017-06-27 ENCOUNTER — APPOINTMENT (OUTPATIENT)
Dept: OBGYN | Facility: CLINIC | Age: 47
End: 2017-06-27

## 2017-07-18 ENCOUNTER — APPOINTMENT (OUTPATIENT)
Dept: OBGYN | Facility: CLINIC | Age: 47
End: 2017-07-18

## 2023-12-21 NOTE — DISCHARGE NOTE ADULT - CLICK TO LAUNCH ORM
[FreeTextEntry1] : S/P Exlap JASMYN, BSO, OMx and resection of diaphragmatic lesions. Meeting post-op milestones   [] post-op precautions given [] f/u pathology [] f/u with Dr. Del Toro in 3 weeks
.

## 2023-12-26 NOTE — H&P PST ADULT - NS ABD PE RECTAL EXAM
[facility] AMBULATORY ENCOUNTER  PLASTIC AND RECONSTRUCTIVE SURGERY POSTOP VISIT    REASON FOR VISIT:  Post-op    DATE OF SURGERY: 12/22/2023     PROCEDURE:  Bilateral tissue expander removal     HISTORY OF PRESENT ILLNESS:  Jairo Carlson is a 38 year old female who [facility] today for follow-up status post above-mentioned procedure.     EXAM:    Visit Vitals  /77 (BP Location: RUE - Right upper extremity, Patient Position: Sitting, Cuff Size: Large Adult)   Pulse 87   Temp 99 °F (37.2 °C) (Oral)   Ht 5' 3\" (1.6 m)   Wt 95.9 kg (211 lb 6.7 oz)   LMP 11/14/2023 (Exact Date)   SpO2 100%   BMI 37.45 kg/m²     General: Alert, in no acute distress, follows commands, moves all extremities     Drains: 4 drains, thin serosanguineous output  Drains were stripped and re-dressed. Both drains appear to be draining without difficulty.     Vac: Incisional vac in intact and providing suction.   New ABD bandages were applied for comfort, ACE wrap reapplied.      Plan for vac and drain removal on 12/29/23.    PLAN:  - Daily dressing change to incisions with abdominal pads.  - Continue to empty and record drain output twice daily. Bring drain recording sheet to next appointment.  - Continue to wear surgical bra.  - Continue activity restrictions and limiting arm movements to decrease risk of seroma. No overhead reaching.  - No lifting, pulling, or pushing more than 10 pounds for 4 weeks  - No lakes, pools, hot tubs for 2 months after surgery.  - Continue Tylenol for pain control  - Return to clinic in 12/29/2023, or sooner if needed.  - Call office with questions or concerns.    The patient indicated understanding of the diagnosis and agreed with the plan of care.        
not examined

## 2025-05-05 NOTE — PRE-OP CHECKLIST - HOW ADMINISTERED
HR=64 bpm, XNYQ=743/59 mmhg, SpO2=96.0 %, Resp=17 B/min, EtCO2=32 mmHg, Apnea=0 Seconds, Pain=0, [place]=10, [place]=2, Comment=Paced Self Administrated